# Patient Record
Sex: MALE | Race: BLACK OR AFRICAN AMERICAN | NOT HISPANIC OR LATINO
[De-identification: names, ages, dates, MRNs, and addresses within clinical notes are randomized per-mention and may not be internally consistent; named-entity substitution may affect disease eponyms.]

---

## 2022-12-06 PROBLEM — Z00.00 ENCOUNTER FOR PREVENTIVE HEALTH EXAMINATION: Status: ACTIVE | Noted: 2022-12-06

## 2022-12-30 ENCOUNTER — APPOINTMENT (OUTPATIENT)
Dept: NEUROLOGY | Facility: CLINIC | Age: 67
End: 2022-12-30
Payer: COMMERCIAL

## 2022-12-30 DIAGNOSIS — Z82.49 FAMILY HISTORY OF ISCHEMIC HEART DISEASE AND OTHER DISEASES OF THE CIRCULATORY SYSTEM: ICD-10-CM

## 2022-12-30 DIAGNOSIS — E78.5 HYPERLIPIDEMIA, UNSPECIFIED: ICD-10-CM

## 2022-12-30 DIAGNOSIS — I27.82 CHRONIC PULMONARY EMBOLISM: ICD-10-CM

## 2022-12-30 DIAGNOSIS — M54.12 RADICULOPATHY, CERVICAL REGION: ICD-10-CM

## 2022-12-30 DIAGNOSIS — R29.898 OTHER SYMPTOMS AND SIGNS INVOLVING THE MUSCULOSKELETAL SYSTEM: ICD-10-CM

## 2022-12-30 DIAGNOSIS — I10 ESSENTIAL (PRIMARY) HYPERTENSION: ICD-10-CM

## 2022-12-30 PROCEDURE — 99205 OFFICE O/P NEW HI 60 MIN: CPT

## 2022-12-30 RX ORDER — ATORVASTATIN CALCIUM 20 MG/1
20 TABLET, FILM COATED ORAL
Refills: 0 | Status: ACTIVE | COMMUNITY

## 2022-12-30 RX ORDER — APIXABAN 5 MG/1
5 TABLET, FILM COATED ORAL
Refills: 0 | Status: ACTIVE | COMMUNITY

## 2022-12-30 RX ORDER — LISINOPRIL AND HYDROCHLOROTHIAZIDE TABLETS 10; 12.5 MG/1; MG/1
TABLET ORAL
Refills: 0 | Status: ACTIVE | COMMUNITY

## 2022-12-30 RX ORDER — AMLODIPINE BESYLATE 10 MG/1
10 TABLET ORAL
Refills: 0 | Status: ACTIVE | COMMUNITY

## 2022-12-30 NOTE — PHYSICAL EXAM
[FreeTextEntry1] : GENERAL APPEARANCE:\par In no acute distress. Alert & oriented. Behavior and affect appropriate to situation.\par SKIN:\par Warm and dry. No rash or suspicious lesions noted. Skin turgor - normal for age.\par HEENT:\par Neck supple\par \par NEUROLOGIC EXAM:\par MENTAL STATUS:\par Oriented to person, place and time.\par Speech is fluent, without dysarthria or aphasia.\par Recent and remote memory are intact.\par Attention span and concentration are normal.\par Adequate historian with normal fund of knowledge.\par Moods are consistent with situation, affect is euthymic.\par Thought process is coherent, content is without delusions or hallucinations.\par \par CRANIAL NERVES:\par CN 2: Visual fields are full to confrontation.\par CN 3, 4, 6: Extraocular movements are intact. No nystagmus or ophthalmoplegia is evident. Pupils are equally round and reactive to light and accommodation.\par CN 5: Facial sensation is intact in all 3 divisions.\par CN 7: Facial excursion - full and symmetric\par CN 8: Hearing is intact.\par CN 9,10,12: The palate elevates symmetrically to phonation. No dysphonia is noted. Tongue, uvula and palate are midline.\par CN 11: Shoulders shrug symmetrically. Sternocleidomastoids full strength bilaterally.\par CN 12: no tongue atrophy or fasciculation\par MOTOR:\par Atrophic change of intrinsic muscle of right hand, APB, ADM and FDI.\par R winged scapula and atrophic of R pectoralis muscles.\par Strength is 3+/5 on the right finger flexors (FDP 3rd/4th>1st/2nd) and APB and ADM. 4/5 on the right finger extensors and wrist extensor. full strength in biceps, triceps and brachioradialist as well as deltoid and inter/external rotation of arm. \par Other muscles are 5/5 including LUE and bilateral LEs.\par No fasciculation noted\par \par SENSORY:\par Reduced sensation (pinprick, light touch) in the right C8 dermatome.\par REFLEXES:\par 1+ all sites\par Plantar down down\par \par COORDINATION:\par Finger to nose and heel to shin testing without dysmetria or dysdiadochokinesia. Rapid alternating movements normal.\par GAIT:\par Able to walk without difficulty, normal Romberg and tandem walk.\par

## 2022-12-30 NOTE — REASON FOR VISIT
HPI: This is a 52 yo male here for f/u HTN, hyperlipidemia and myalgias  He stopped his crestor for about 3 weeks due to leg myalgias like he had with other statins as well.  His myalgias did resolve off of the crestor  His brother has severe myalgias from statins and can't take them either.  He hasn't tried co Q 10 or vit D  He ran out of his HCTZ about 5 days and BP fine but did have an episode of vertigo yesterday  Would like to stay off of HCTZ since BP fine today without it    Notes that yesterday at work he had a 2min episode of vertigo that came on suddenly when sitting at his computer.    The room started spinning and he put up his hand and focused on this and sxs resolved  Denies tinnitus or hearing loss  Denies vision changes or numbness tingling of extremities  Denies any recurrence of these sxs.  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    Followed by Rivera Dinero at [facility]     Past Surgical History:   Procedure Laterality Date     TONSILLECTOMY       wisdom teeth       Social History   Substance Use Topics     Smoking status: Never Smoker     Smokeless tobacco: Never Used     Alcohol use 0.0 oz/week     0 Standard drinks or equivalent per week      Comment: few beers on weekend     Current Outpatient Prescriptions   Medication Sig Dispense Refill     amLODIPine (NORVASC) 5 MG tablet Take 1 tablet (5 mg) by mouth daily 90 tablet 3     fluticasone-salmeterol (ADVAIR) 250-50 MCG/DOSE diskus inhaler Inhale 1 puff into the lungs 2 times daily 3 Inhaler 3     albuterol (PROAIR HFA/PROVENTIL HFA/VENTOLIN HFA) 108 (90 BASE) MCG/ACT Inhaler Inhale 2 puffs into the lungs every 6 hours 1 Inhaler 6     loratadine (CLARITIN) 10 MG tablet Take 1 tablet  "(10 mg) by mouth daily (Patient taking differently: Take 10 mg by mouth as needed ) 30 tablet 0     buPROPion (WELLBUTRIN XL) 300 MG 24 hr tablet Take 1 tablet (300 mg) by mouth every morning 90 tablet 1     venlafaxine (EFFEXOR-XR) 75 MG 24 hr capsule Take 150 mg by mouth daily        [DISCONTINUED] albuterol (PROAIR HFA, PROVENTIL HFA, VENTOLIN HFA) 108 (90 BASE) MCG/ACT inhaler Inhale 2 puffs into the lungs every 6 hours 1 Inhaler 3     [DISCONTINUED] fluticasone-salmeterol (ADVAIR) 250-50 MCG/DOSE diskus inhaler Inhale 1 puff into the lungs 2 times daily 3 Inhaler 3     [DISCONTINUED] amLODIPine (NORVASC) 5 MG tablet Take 1 tablet (5 mg) by mouth daily 90 tablet 3     Allergies   Allergen Reactions     Hmg-Coa-R Inhibitors Other (See Comments)     Muscle aches      Lisinopril      cough     FAMILY HISTORY NOTED AND REVIEWED  PHYSICAL EXAM:    /83 (BP Location: Left arm, Patient Position: Chair, Cuff Size: Adult Large)  Pulse 89  Temp 98.6  F (37  C) (Oral)  Ht 5' 10\" (1.778 m)  Wt 249 lb 4.8 oz (113.1 kg)  SpO2 97%  BMI 35.77 kg/m2    Patient appears non toxic  Neuro: speech and gait normal. No facial asymmetry.  +nystagmus with L lateral gaze  Ears: TMs pearly grey, no cerumen  Neck: no carotid bruits  Heart: RRR without m/r/g  Lungs: CTA bilat    Assessment and Plan:     (I10) Essential hypertension, benign  (primary encounter diagnosis)  Comment: okay to stay off of HCTZ but MUST MONITOR BP closely and let me know what this is running on norvasc alone.  Plan: amLODIPine (NORVASC) 5 MG tablet, Comprehensive        metabolic panel        Return fasting for labs    (E78.5) Hyperlipidemia with target LDL less than 130  Comment: he stopped crestor due to myalgias.  Check CK and consider trying crestor QOD with CoQ 10  Plan: Lipid Profile            (M79.1,  T46.6X5A) Myalgia due to statin  Comment:   Plan: Vitamin D Deficiency, CK total            (R42) Vertigo  Comment:   Plan: resolved, suspect BPPV. " Let me know if this recurs.    (J45.30) Mild persistent asthma, uncomplicated  Comment:   Plan: fluticasone-salmeterol (ADVAIR) 250-50 MCG/DOSE        diskus inhaler, albuterol (PROAIR HFA/PROVENTIL        HFA/VENTOLIN HFA) 108 (90 BASE) MCG/ACT Inhaler            (R97.20) Elevated prostate specific antigen (PSA)  Comment: f/u with urol  Plan: PSA, screen            (Z11.59) Need for hepatitis C screening test  Comment:   Plan: Hepatitis C Screen Reflex to HCV RNA Quant and         Genotype          Patient Instructions   Make follow up appt with Dr. López for the elevated PSA  Okay to stay off of hydrochlorathiazide and closely monitor the blood pressure  Let me know if BP> 140/90    We may consider having you try Co Q 10 and vit D with crestor just every other day     Make appt for fasting labs asap        Sandra Guardado PA-C             [Initial Evaluation] : an initial evaluation [FreeTextEntry1] : worsening right arm, hand weakness x 3-4 months

## 2022-12-30 NOTE — ASSESSMENT
[FreeTextEntry1] : The patient is a 68 yo man who has a right arm and hand weakness with exam showing atrophic changes in C4-C5 and C8/T1 supplied muscles. There is also a right C8 sensory loss.\par I am concerning for a C4/5 and C8/T1-radiculopathy with the differential diagnoses of ulnar neuropathy (superimposed?). \par The patient is concerned that this could be  ALS but I do not see the classic features of ALS.\par \par I recommend MRI c-spine WO contrast, X-ray C-spine F/E view, EMG study of R extremities.\par He will continue on his current physical therapy schedule.\par \par I will see him for a f/u in 4 weeks (after all tests are completed).\par
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
## 2023-01-14 ENCOUNTER — OUTPATIENT (OUTPATIENT)
Dept: OUTPATIENT SERVICES | Facility: HOSPITAL | Age: 68
LOS: 1 days | End: 2023-01-14
Payer: COMMERCIAL

## 2023-01-14 ENCOUNTER — APPOINTMENT (OUTPATIENT)
Dept: MRI IMAGING | Facility: CLINIC | Age: 68
End: 2023-01-14
Payer: COMMERCIAL

## 2023-01-14 DIAGNOSIS — M54.12 RADICULOPATHY, CERVICAL REGION: ICD-10-CM

## 2023-01-14 DIAGNOSIS — R29.898 OTHER SYMPTOMS AND SIGNS INVOLVING THE MUSCULOSKELETAL SYSTEM: ICD-10-CM

## 2023-01-14 PROCEDURE — 72052 X-RAY EXAM NECK SPINE 6/>VWS: CPT

## 2023-01-14 PROCEDURE — 72052 X-RAY EXAM NECK SPINE 6/>VWS: CPT | Mod: 26

## 2023-01-14 PROCEDURE — 72141 MRI NECK SPINE W/O DYE: CPT

## 2023-01-14 PROCEDURE — 72141 MRI NECK SPINE W/O DYE: CPT | Mod: 26

## 2023-01-30 ENCOUNTER — TRANSCRIPTION ENCOUNTER (OUTPATIENT)
Age: 68
End: 2023-01-30